# Patient Record
Sex: MALE | Race: WHITE | Employment: FULL TIME | URBAN - METROPOLITAN AREA
[De-identification: names, ages, dates, MRNs, and addresses within clinical notes are randomized per-mention and may not be internally consistent; named-entity substitution may affect disease eponyms.]

---

## 2021-05-10 ENCOUNTER — HOSPITAL ENCOUNTER (EMERGENCY)
Age: 41
Discharge: HOME | End: 2021-05-10
Payer: COMMERCIAL

## 2021-05-10 VITALS
OXYGEN SATURATION: 97 % | DIASTOLIC BLOOD PRESSURE: 89 MMHG | TEMPERATURE: 98.4 F | BODY MASS INDEX: 29.52 KG/M2 | RESPIRATION RATE: 18 BRPM | HEART RATE: 104 BPM | SYSTOLIC BLOOD PRESSURE: 132 MMHG | WEIGHT: 230 LBS | HEIGHT: 74 IN

## 2021-05-10 DIAGNOSIS — Z01.30 BLOOD PRESSURE CHECK: Primary | ICD-10-CM

## 2021-05-10 PROCEDURE — 99202 OFFICE O/P NEW SF 15 MIN: CPT

## 2021-05-10 RX ORDER — ALPRAZOLAM 0.5 MG/1
0.5 TABLET ORAL 3 TIMES DAILY PRN
COMMUNITY

## 2021-05-10 NOTE — ED PROVIDER NOTES
Department of Emergency [address] [facility]  Provider Note  Admit Date/Time: 5/10/2021  6:40 PM  Room:   NAME: Florentin Rosario  : 1980  MRN: [id number]     Chief Complaint:  Hypertension (Pt c/o feeling dizzy for 1 month, checked is BP today and it was 168/105, denies pain or dizziness at this time, states he has hx of panic attacks) and Dizziness    History of Present Illness        Florentin Rosario is a 45 y.o. male who has a past medical history of:   Past Medical History:   Diagnosis Date    Depression     presents to the urgent care center by private car for evaluation of his blood pressure. He said over the past month he has been having more anxiety spells and anxiety and panic attacks he takes Xanax for those his doctor tried to put him on some antidepressants and he said he could not tolerate them and he had to stop them so he is back to taking Xanax as needed. He said he checked his blood pressure today and it was high at home that he went to a pharmacy and checked it and it was still high so he decided to come in here to get it rechecked. He said he has been having dizzy spells on and off especially when he has a panic attacks. At the current time he is not dizzy he denies chest pain he denies anxiety. He  states he just mainly wanted to have his blood pressure checked. He states his wife is also 6 months pregnant and that is causing him some anxiety. ROS    Pertinent positives and negatives are stated within HPI, all other systems reviewed and are negative. History reviewed. No pertinent surgical history. Social History:  reports that he quit smoking about 17 years ago. He has never used smokeless tobacco.  Family History: family history is not on file. Allergies: Patient has no known allergies.     Physical Exam   Oxygen Saturation Interpretation: Normal.   ED Triage Vitals [05/10/21 1840]   BP Temp Temp Source Pulse Resp SpO2 Height Weight   132/89 98.4 °F (36.9 °C) Infrared 104 18 97 % 6' 2\" (1.88 m) 230 lb (104.3 kg)       Physical Exam  · Constitutional/General: Alert and oriented x3, well appearing, non toxic in NAD  · HEENT:  NC/NT. Clear conjunctiva,  Airway patent. · Neck: Supple, full ROM,   · Respiratory: Lungs clear to auscultation bilaterally, no wheezes, rales, or rhonchi. Not in respiratory distress  · CV:  Regular rate. Regular rhythm. No murmurs, gallops, or rubs. 2+ distal pulses  · GI:  Abdomen Soft, Non tender, Non distended. +BS. · Musculoskeletal: Moves all extremities x 4. Warm and well perfused,  · Integument: skin warm and dry. No rashes. · Lymphatic: no lymphadenopathy noted  · Neurologic: GCS 15, no focal deficits, symmetric strength 5/5 in the upper and lower extremities bilaterally  · Psychiatric: Normal Affect    Lab / Imaging Results   (All laboratory and radiology results have been personally reviewed by myself)  Labs:  No results found for this visit on 05/10/21. Imaging: All Radiology results interpreted by Radiologist unless otherwise noted. No orders to display       ED Course / Medical Decision Making   Medications - No data to display       Consult(s):   None        MDM:   Patient's blood pressure right now is 132/89 he is in no distress his exam is normal he said he is not dizzy is not anxious he feels okay right now. He is worried that his blood pressure may have been too high he is not on any antihypertensive medications he said the last two times has been of his doctor this month it was also a little bit elevated. They tried him on Wellbutrin and also on Celexa and he said he could not tolerate those medicines he is taking Xanax three times a day as needed for his anxiety. He has no complaints at the current time I discussed blood pressure management with him and dietary changes such as low-sodium advised him to monitor his blood pressure once a day and follow-up with his doctor. Assessment      1.

## 2023-05-23 PROBLEM — E55.9 VITAMIN D DEFICIENCY: Status: ACTIVE | Noted: 2023-05-23

## 2023-05-23 PROBLEM — F41.0 PANIC ATTACKS: Status: ACTIVE | Noted: 2023-05-23

## 2023-05-23 PROBLEM — E66.3 OVER WEIGHT: Status: ACTIVE | Noted: 2023-05-23

## 2023-05-23 PROBLEM — R21 RASH: Status: ACTIVE | Noted: 2023-05-23

## 2023-05-23 PROBLEM — E66.9 OBESITY: Status: ACTIVE | Noted: 2023-05-23

## 2023-05-23 PROBLEM — I10 HTN (HYPERTENSION): Status: ACTIVE | Noted: 2023-05-23

## 2023-05-23 PROBLEM — R53.83 FATIGUE: Status: ACTIVE | Noted: 2023-05-23

## 2023-05-23 PROBLEM — R19.4 ALTERED BOWEL HABITS: Status: ACTIVE | Noted: 2023-05-23

## 2023-05-23 PROBLEM — F41.9 ANXIETY: Status: ACTIVE | Noted: 2023-05-23

## 2023-05-23 PROBLEM — E66.9 OBESITY (BMI 30.0-34.9): Status: ACTIVE | Noted: 2023-05-23

## 2023-05-23 PROBLEM — D72.819 LEUCOPENIA: Status: ACTIVE | Noted: 2023-05-23

## 2023-05-23 PROBLEM — E78.5 DYSLIPIDEMIA: Status: ACTIVE | Noted: 2023-05-23
